# Patient Record
Sex: FEMALE | Race: OTHER | Employment: UNEMPLOYED
[De-identification: names, ages, dates, MRNs, and addresses within clinical notes are randomized per-mention and may not be internally consistent; named-entity substitution may affect disease eponyms.]

---

## 2023-02-24 ENCOUNTER — HOSPITAL ENCOUNTER (EMERGENCY)
Facility: HOSPITAL | Age: 23
Discharge: HOME | End: 2023-02-24
Attending: EMERGENCY MEDICINE

## 2023-02-24 VITALS
RESPIRATION RATE: 16 BRPM | SYSTOLIC BLOOD PRESSURE: 184 MMHG | TEMPERATURE: 98 F | OXYGEN SATURATION: 99 % | DIASTOLIC BLOOD PRESSURE: 97 MMHG | HEART RATE: 60 BPM

## 2023-02-24 DIAGNOSIS — H60.12 CELLULITIS OF LEFT EAR: Primary | ICD-10-CM

## 2023-02-24 PROCEDURE — 99283 EMERGENCY DEPT VISIT LOW MDM: CPT | Performed by: HEALTH CARE PROVIDER

## 2023-02-24 RX ORDER — CEPHALEXIN 250 MG/1
250 CAPSULE ORAL EVERY 6 HOURS
Status: DISCONTINUED | OUTPATIENT
Start: 2023-02-24 | End: 2023-02-24

## 2023-02-24 RX ORDER — LIDOCAINE HYDROCHLORIDE 10 MG/ML
2 INJECTION, SOLUTION EPIDURAL; INFILTRATION; INTRACAUDAL; PERINEURAL
Status: DISCONTINUED | OUTPATIENT
Start: 2023-02-24 | End: 2023-02-24 | Stop reason: HOSPADM

## 2023-02-24 RX ORDER — CEPHALEXIN 500 MG/1
500 CAPSULE ORAL 2 TIMES DAILY
Qty: 14 CAPSULE | Refills: 0 | Status: SHIPPED | OUTPATIENT
Start: 2023-02-24 | End: 2023-03-03

## 2023-02-24 ASSESSMENT — ENCOUNTER SYMPTOMS
RESPIRATORY NEGATIVE: 1
GASTROINTESTINAL NEGATIVE: 1

## 2023-02-24 NOTE — DISCHARGE INSTRUCTIONS
Wash your ear aggressively every day with soap and water solution. Afterwards apply the triple antibiotic solution. Continue taking antibiotics as prescribed.   If you start to notice worsening of symptoms, increased redness, pain, or swelling, or if you notice any changes to your hearing, please come back to the emergency room immediately

## 2023-02-24 NOTE — ED NOTES
Above pt was d/c and agreed that only her information was included on the d/c papers.      Emeli Leigh RN  02/24/23 [id number]

## 2023-02-24 NOTE — ED PROVIDER NOTES
EMERGENCY DEPARTMENT HISTORY AND PHYSICAL EXAM    4:43 PM      Date: 2/24/2023  Patient Name: Amy Ambrocio    History of Presenting Illness     Chief Complaint   Patient presents with    Ear Problem         History Provided By: Patient  Location/Duration/Severity/Modifying factors   68-year-old female with no significant past medical history presenting with left ear swelling and pain. Patient recently was attempting to gauge her ear and had recently expanded the diameter of her gauge in her left ear, when she started to have increased redness swelling and pain. Patient notes that she had some clear to whitish discharge from her left ear over the last 24 hours. She notes that until earlier this morning, the hole made by the gauge was still patent, but now appears scabbed over. She has not had any fevers chills or night sweats, and no other systemic symptoms to indicate anything other than local site infection. She says that she has been cleaning the area with alcohol as needed. She also states that this is happened to her before in the same year. PCP: None None    Current Facility-Administered Medications   Medication Dose Route Frequency Provider Last Rate Last Admin    lidocaine PF 1 % injection 2 mL  2 mL IntraDERmal NOW Dedra Corbin MD         Current Outpatient Medications   Medication Sig Dispense Refill    cephALEXin (KEFLEX) 500 MG capsule Take 1 capsule by mouth 2 times daily for 7 days 14 capsule 0       Past History     Past Medical History:  No past medical history on file. Asthma    Past Surgical History:  No past surgical history on file. Denies    Family History:  No family history on file. Social History:  Reports recreational alcohol use    Allergies:  No Known Allergies      Review of Systems       Review of Systems   Constitutional:  Negative for chills, diaphoresis, fatigue and fever.    HENT:  Positive for ear discharge (From lobule of the ear, nothing from the canal) and ear pain.    Respiratory: Negative. Cardiovascular: Negative. Gastrointestinal: Negative. Musculoskeletal: Negative. Skin:  Positive for wound. Psychiatric/Behavioral: Negative. Physical Exam   BP (!) 184/97   Pulse 60   Temp 98 °F (36.7 °C) (Oral)   Resp 16   SpO2 99%       Physical Exam  Constitutional:       General: She is not in acute distress. Appearance: Normal appearance. She is obese. She is not toxic-appearing. HENT:      Head: Normocephalic and atraumatic. Left Ear: Tympanic membrane and ear canal normal.      Ears:      Comments: Left ear lobule with 1 inch diameter gauge removed. Area appears erythematous around the diameter of the previous gauge. Thin scabbing overlies the area. There is scant serous discharge from the area as well. Able to pass a CTA through year lobule. No continuation of erythema or swelling into the canal, and no internal ear symptoms. No changes in hearing  Cardiovascular:      Rate and Rhythm: Normal rate and regular rhythm. Pulmonary:      Effort: Pulmonary effort is normal.   Abdominal:      Tenderness: There is no abdominal tenderness. Musculoskeletal:         General: No deformity. Cervical back: Normal range of motion. Skin:     General: Skin is warm and dry. Neurological:      General: No focal deficit present. Mental Status: She is alert and oriented to person, place, and time. Psychiatric:         Mood and Affect: Mood normal.         Diagnostic Study Results     Labs -  No results found for this or any previous visit (from the past 12 hour(s)). Radiologic Studies -   No orders to display         Medical Decision Making   I am the first provider for this patient. I reviewed the vital signs, available nursing notes, past medical history, past surgical history, family history and social history. Vital Signs-Reviewed the patient's vital signs. EKG: Not obtained    Records Reviewed: Old medical records. (Time of Review: 4:43 PM)    ED Course: Progress Notes, Reevaluation, and Consults:    Provider Notes (Medical Decision Making):   MDM  Number of Diagnoses or Management Options  Cellulitis of left ear  Diagnosis management comments: 82-year-old female with past medical history of asthma who presents with left ear swelling, erythema, discharge from her previous gauge site. Patient states that she has had swelling and pain since approximately 3 days ago, and at that time she removed her jewelry. She noticed that the site had some scant discharge, and believes she had an ear infection which is why she presented to the ER. The ear in the home made by the gauge was located in the left lobule and was able to be probed with CTA. Area was cleaned, and triple antibiotic gel was applied over the site. At this time, current suspicion is high for superficial infection such as cellulitis, which is not extending into the ear canal.  Advised patient to continue aggressively cleaning area with soap and water, applying triple antibiotic gel, and will prescribe a short 7-day course of Keflex. We discussed return precautions to include worsening symptoms, development of systemic symptoms, and development of any abnormalities with her hearing. ED Course as of 02/24/23 1646   Fri Feb 24, 2023   1603 Patient seen and evaluated in the F1 area. Patient has what appears to be cellulitis of the left ear around her previous ghassan site. Swelling is not extending into the ear canal, patient is denying any auditory symptoms. Patient denies any systemic symptoms at this time. [HC]   1624 Attempted to clean area and remove any pus that is overlying. Gauge site was able to be opened with CTA, and did not require lidocaine or I&D.   Site was washed and covered with triple antibiotic therapy gel and band-aid [HC]      ED Course User Index  [HC] Karis Pozo MD       Procedures    Critical Care Time:       Diagnosis     Clinical Impression:   1. Cellulitis of left ear        Disposition: Discharged home, on antibiotic therapy    [facility] EMERGENCY DEPT  [address]  [phone]    As needed, If symptoms worsen       Disclaimer: Sections of this note are dictated using utilizing voice recognition software. Minor typographical errors may be present. If questions arise, please do not hesitate to contact me or call our department.          Annie Wolfe MD  Resident  02/24/23 [id number]